# Patient Record
Sex: MALE | Race: WHITE
[De-identification: names, ages, dates, MRNs, and addresses within clinical notes are randomized per-mention and may not be internally consistent; named-entity substitution may affect disease eponyms.]

---

## 2019-03-02 ENCOUNTER — HOSPITAL ENCOUNTER (INPATIENT)
Dept: HOSPITAL 10 - E/R | Age: 49
LOS: 3 days | Discharge: HOME | DRG: 65 | End: 2019-03-05
Attending: INTERNAL MEDICINE | Admitting: INTERNAL MEDICINE
Payer: COMMERCIAL

## 2019-03-02 ENCOUNTER — HOSPITAL ENCOUNTER (INPATIENT)
Dept: HOSPITAL 91 - E/R | Age: 49
LOS: 3 days | Discharge: HOME | DRG: 65 | End: 2019-03-05
Payer: COMMERCIAL

## 2019-03-02 VITALS
BODY MASS INDEX: 27.1 KG/M2 | HEIGHT: 66 IN | WEIGHT: 168.65 LBS | BODY MASS INDEX: 27.1 KG/M2 | HEIGHT: 66 IN | WEIGHT: 168.65 LBS

## 2019-03-02 DIAGNOSIS — I10: ICD-10-CM

## 2019-03-02 DIAGNOSIS — E11.65: ICD-10-CM

## 2019-03-02 DIAGNOSIS — I16.1: ICD-10-CM

## 2019-03-02 DIAGNOSIS — I61.9: Primary | ICD-10-CM

## 2019-03-02 DIAGNOSIS — G51.31: ICD-10-CM

## 2019-03-02 LAB
ADD MAN DIFF?: NO
ANION GAP: 13 (ref 5–13)
BASOPHIL #: 0 10^3/UL (ref 0–0.1)
BASOPHILS %: 0.4 % (ref 0–2)
BLOOD UREA NITROGEN: 10 MG/DL (ref 7–20)
CALCIUM: 9.5 MG/DL (ref 8.4–10.2)
CARBON DIOXIDE: 28 MMOL/L (ref 21–31)
CHLORIDE: 95 MMOL/L (ref 97–110)
CHOL/HDL RATIO: 5.1 RATIO
CHOLESTEROL: 216 MG/DL (ref 100–200)
CREATININE: 0.89 MG/DL (ref 0.61–1.24)
EOSINOPHILS #: 0 10^3/UL (ref 0–0.5)
EOSINOPHILS %: 0.1 % (ref 0–7)
GLUCOSE: 353 MG/DL (ref 70–220)
HDL CHOLESTEROL: 42 MG/DL (ref 28–71)
HEMATOCRIT: 45.3 % (ref 42–52)
HEMOGLOBIN A1C: 10 % (ref 0–5.9)
HEMOGLOBIN: 16.4 G/DL (ref 14–18)
IMMATURE GRANS #M: 0.03 10^3/UL (ref 0–0.03)
IMMATURE GRANS % (M): 0.3 % (ref 0–0.43)
INR: 0.89
INR: 0.94
LDL CHOLESTEROL,CALCULATED: 129 MG/DL
LYMPHOCYTES #: 1.4 10^3/UL (ref 0.8–2.9)
LYMPHOCYTES %: 13 % (ref 15–51)
MEAN CORPUSCULAR HEMOGLOBIN: 31.2 PG (ref 29–33)
MEAN CORPUSCULAR HGB CONC: 36.2 G/DL (ref 32–37)
MEAN CORPUSCULAR VOLUME: 86.3 FL (ref 82–101)
MEAN PLATELET VOLUME: 10.3 FL (ref 7.4–10.4)
MONOCYTE #: 0.5 10^3/UL (ref 0.3–0.9)
MONOCYTES %: 4.7 % (ref 0–11)
NEUTROPHIL #: 8.7 10^3/UL (ref 1.6–7.5)
NEUTROPHILS %: 81.5 % (ref 39–77)
NUCLEATED RED BLOOD CELLS #: 0 10^3/UL (ref 0–0)
NUCLEATED RED BLOOD CELLS%: 0 /100WBC (ref 0–0)
PARTIAL THROMBOPLASTIN TIME: 24.1 SEC (ref 23–35)
PARTIAL THROMBOPLASTIN TIME: 24.6 SEC (ref 23–35)
PLATELET COUNT: 266 10^3/UL (ref 140–415)
POTASSIUM: 4.1 MMOL/L (ref 3.5–5.1)
PROTIME: 12.2 SEC (ref 11.9–14.9)
PROTIME: 12.7 SEC (ref 11.9–14.9)
PT RATIO: 1
PT RATIO: 1
RED BLOOD COUNT: 5.25 10^6/UL (ref 4.7–6.1)
RED CELL DISTRIBUTION WIDTH: 10.8 % (ref 11.5–14.5)
SODIUM: 136 MMOL/L (ref 135–144)
TRIGLYCERIDES: 226 MG/DL (ref 0–149)
TROPONIN-I: < 0.012 NG/ML (ref 0–0.12)
WHITE BLOOD COUNT: 10.7 10^3/UL (ref 4.8–10.8)

## 2019-03-02 PROCEDURE — 97162 PT EVAL MOD COMPLEX 30 MIN: CPT

## 2019-03-02 PROCEDURE — 83036 HEMOGLOBIN GLYCOSYLATED A1C: CPT

## 2019-03-02 PROCEDURE — 85610 PROTHROMBIN TIME: CPT

## 2019-03-02 PROCEDURE — 96375 TX/PRO/DX INJ NEW DRUG ADDON: CPT

## 2019-03-02 PROCEDURE — 83735 ASSAY OF MAGNESIUM: CPT

## 2019-03-02 PROCEDURE — 97167 OT EVAL HIGH COMPLEX 60 MIN: CPT

## 2019-03-02 PROCEDURE — 87081 CULTURE SCREEN ONLY: CPT

## 2019-03-02 PROCEDURE — 71045 X-RAY EXAM CHEST 1 VIEW: CPT

## 2019-03-02 PROCEDURE — 82962 GLUCOSE BLOOD TEST: CPT

## 2019-03-02 PROCEDURE — 80048 BASIC METABOLIC PNL TOTAL CA: CPT

## 2019-03-02 PROCEDURE — 70496 CT ANGIOGRAPHY HEAD: CPT

## 2019-03-02 PROCEDURE — 70450 CT HEAD/BRAIN W/O DYE: CPT

## 2019-03-02 PROCEDURE — 85025 COMPLETE CBC W/AUTO DIFF WBC: CPT

## 2019-03-02 PROCEDURE — 96374 THER/PROPH/DIAG INJ IV PUSH: CPT

## 2019-03-02 PROCEDURE — 85730 THROMBOPLASTIN TIME PARTIAL: CPT

## 2019-03-02 PROCEDURE — 93005 ELECTROCARDIOGRAM TRACING: CPT

## 2019-03-02 PROCEDURE — 36415 COLL VENOUS BLD VENIPUNCTURE: CPT

## 2019-03-02 PROCEDURE — 80307 DRUG TEST PRSMV CHEM ANLYZR: CPT

## 2019-03-02 PROCEDURE — 92610 EVALUATE SWALLOWING FUNCTION: CPT

## 2019-03-02 PROCEDURE — 80053 COMPREHEN METABOLIC PANEL: CPT

## 2019-03-02 PROCEDURE — 70498 CT ANGIOGRAPHY NECK: CPT

## 2019-03-02 PROCEDURE — 97530 THERAPEUTIC ACTIVITIES: CPT

## 2019-03-02 PROCEDURE — 81003 URINALYSIS AUTO W/O SCOPE: CPT

## 2019-03-02 PROCEDURE — 80061 LIPID PANEL: CPT

## 2019-03-02 PROCEDURE — 70551 MRI BRAIN STEM W/O DYE: CPT

## 2019-03-02 PROCEDURE — 84484 ASSAY OF TROPONIN QUANT: CPT

## 2019-03-02 PROCEDURE — 99291 CRITICAL CARE FIRST HOUR: CPT

## 2019-03-02 PROCEDURE — 84100 ASSAY OF PHOSPHORUS: CPT

## 2019-03-02 RX ADMIN — VASOPRESSIN 1 ML/2 S: 20 INJECTION, SOLUTION INTRAMUSCULAR; SUBCUTANEOUS at 23:54

## 2019-03-02 RX ADMIN — LABETALOL HYDROCHLORIDE 1 MG: 5 INJECTION, SOLUTION INTRAVENOUS at 23:30

## 2019-03-02 RX ADMIN — NICARDIPINE HYDROCHLORIDE 1 MLS/HR: 0.1 INJECTION, SOLUTION INTRAVENOUS at 23:29

## 2019-03-02 RX ADMIN — SODIUM CHLORIDE 1 ML: 9 INJECTION, SOLUTION INTRAMUSCULAR; INTRAVENOUS; SUBCUTANEOUS at 23:54

## 2019-03-02 RX ADMIN — LIDOCAINE HYDROCHLORIDE 1 MLS/HR: 10 INJECTION, SOLUTION EPIDURAL; INFILTRATION; INTRACAUDAL; PERINEURAL at 22:48

## 2019-03-02 RX ADMIN — LABETALOL HYDROCHLORIDE 1 MG: 5 INJECTION, SOLUTION INTRAVENOUS at 22:47

## 2019-03-02 RX ADMIN — IODIXANOL 1 ML: 320 INJECTION, SOLUTION INTRAVASCULAR at 23:54

## 2019-03-02 NOTE — ERD
ER Documentation


Chief Complaint


Chief Complaint





pt c/o dizziness and weakness x 2 hours





HPI


48-year-old male with no known past medical history presenting with dizziness 


and weakness that started around 8 PM tonight.  He was driving when he suddenly 


felt some dizziness, like he was "drunk".  He was having some difficulty walking


as well due to the dizziness.  He had no other associated symptoms.  He denies 


any headache, vision disturbance, focal weakness or numbness, chest pain, or 


shortness of breath.  He does generally feel weak however.  His family member at


home checked his blood sugar and it was high in the 300s.  He came to the ER for


evaluation and was noted to be hypertensive and brought back in.





ROS


All systems reviewed and are negative except as per history of present illness.





Allergies


Allergies:  


Coded Allergies:  


     No Known Allergy (Unverified , 3/2/19)





PMhx/Soc


Medical and Surgical Hx:  pt denies Surgical Hx


History of Surgery:  No


Hx Alcohol Use:  Yes (Daily, few beers)


Hx Substance Use:  No


Hx Tobacco Use:  No





FmHx


Hypertension


Family History:  diabetes





Physical Exam


Vitals





Vital Signs


  Date      Temp  Pulse  Resp  B/P (MAP)   Pulse Ox  O2          O2 Flow    FiO2


Time                                                 Delivery    Rate


    3/2/19  98.7     98    16     242/140        97


     22:22                          (174)





Physical Exam


Const:   No acute distress


Head:   Atraumatic 


Eyes:    Normal Conjunctiva, PERRLA, EOMI, no nystagmus


ENT:    Normal External Ears, Nose and Mouth.


Neck:               Full range of motion. No meningismus.  No JVD


Resp:   Clear to auscultation bilaterally


Cardio:   Regular rate and rhythm, no murmurs.  2+ distal pulses in all 4 


extremities, equal bilaterally


Abd:    Soft, non tender, non distended. Normal bowel sounds


Skin:   No petechiae or rashes


Back:   No midline or flank tenderness


Ext:    No cyanosis, or edema


Neur:   Awake and alert, oriented x3, no facial asymmetry, cranial nerves 


intact, strength and sensations intact in all 4 extremities, somewhat unsteady 


gait


Psych:    Normal Mood and Affect


Result Diagram:  


3/2/19 2244                                                                     


          3/2/19 2244





Results 24 hrs





Laboratory Tests


Test
                                  3/2/19
22:44  3/2/19
23:14  3/2/19
23:25


White Blood Count                     10.7 10^3/ul


Red Blood Count                       5.25 10^6/ul


Hemoglobin                               16.4 g/dl


Hematocrit                                  45.3 %


Mean Corpuscular Volume                    86.3 fl


Mean Corpuscular Hemoglobin                31.2 pg


Mean Corpuscular Hemoglobin
Concent     36.2 g/dl 
  
             



Red Cell Distribution Width                 10.8 %


Platelet Count                         266 10^3/UL


Mean Platelet Volume                       10.3 fl


Immature Granulocytes %                    0.300 %


Neutrophils %                               81.5 %


Lymphocytes %                               13.0 %


Monocytes %                                  4.7 %


Eosinophils %                                0.1 %


Basophils %                                  0.4 %


Nucleated Red Blood Cells %            0.0 /100WBC


Immature Granulocytes #              0.030 10^3/ul


Neutrophils #                          8.7 10^3/ul


Lymphocytes #                          1.4 10^3/ul


Monocytes #                            0.5 10^3/ul


Eosinophils #                          0.0 10^3/ul


Basophils #                            0.0 10^3/ul


Nucleated Red Blood Cells #            0.0 10^3/ul


Prothrombin Time                          12.2 Sec                    12.7 Sec


Prothrombin Time Ratio                         1.0                         1.0


INR International Normalized
Ratio           0.89 
  
                   0.94 



Activated Partial
Thromboplast Time      24.6 Sec 
  
               24.1 Sec 



Sodium Level                            136 mmol/L


Potassium Level                         4.1 mmol/L


Chloride Level                           95 mmol/L


Carbon Dioxide Level                     28 mmol/L


Anion Gap                                       13


Blood Urea Nitrogen                       10 mg/dl


Creatinine                              0.89 mg/dl


Est Glomerular Filtrat Rate
mL/min   > 60 mL/min 
   
             



Glucose Level                            353 mg/dl


Hemoglobin A1c                              10.0 %


Calcium Level                            9.5 mg/dl


Troponin I                           < 0.012 ng/ml


Bedside Glucose                                        347 mg/dL


Triglycerides Level                                                  226 mg/dl


Cholesterol Level                                                    216 mg/dl


LDL Cholesterol, Calculated                                          129 mg/dl


HDL Cholesterol                                                       42 mg/dl


Cholesterol/HDL Ratio                                                5.1 RATIO


Ethyl Alcohol Level                                                Pending





Current Medications


 Medications
   Dose
          Sig/Criselda
       Start Time
   Status  Last


 (Trade)       Ordered        Route
 PRN     Stop Time              Admin
Dose


                              Reason                                Admin


 Sodium         500 ml @ 
     Q1H STAT
      3/2/19        DC            3/2/19


Chloride       500 mls/hr     IV
            22:34
 3/2/19                22:48



                                             23:33


 Labetalol      20 mg          ONCE  ONCE
    3/2/19        DC            3/2/19


HCl
                          IV
            23:00
 3/2/19                22:47



(Labetalol)                                  23:01


 Nicardipine    200 ml @ 
     ONCE  STAT
    3/2/19                      3/2/19


HCl            50 mls/hr      IV
            23:17
 3/3/19                23:29



                                             03:16


 Labetalol      20 mg          ONCE  ONCE
    3/2/19        DC       



HCl
                          IV
            23:30
 3/2/19


(Labetalol)                                  23:31


 IV Flush
      10 ml          STK-MED        3/2/19        DC            3/2/19


(NS 10 ml)                    ONCE
 .ROUTE
  23:27
 3/2/19                23:54



                                             23:28


 Sodium         100 ml @ ud    STK-MED        3/2/19        DC            3/2/19


Chloride                      ONCE
 .ROUTE
  23:27
 3/2/19                23:54



                                             23:28


 Iodixanol
     100 ml         STK-MED        3/2/19        DC            3/2/19


(Visipaque                    ONCE
 .ROUTE
  23:27
 3/2/19                23:54



Locm)                                        23:28


 IV Flush
      10 ml          STK-MED        3/2/19        DC       



(NS 10 ml)                    ONCE
 .ROUTE
  23:36
 3/2/19


                                             23:37


 Sodium         100 ml @ ud    STK-MED        3/2/19        DC       



Chloride                      ONCE
 .ROUTE
  23:36
 3/2/19


                                             23:37


 Iohexol        100 ml @ ud    STK-MED        3/2/19        DC       



                              ONCE
 .ROUTE
  23:36
 3/2/19


                                             23:37








Procedures/MDM


EMERGENT LABS AND DIAGNOSTIC STUDIES: 


Lab Results above were reviewed and interpreted by me. 





CBC: no anemia or evidence of infection


BMP: Hyperglycemia without evidence of acidosis.  No evidence of electrolyte 


abnormality, renal failure, hypoglycemia


Troponin within normal limits, not indicative of cardiac ischemia


UA: Pending





12-lead EKG was interpreted by ALEX Martinez MD: 


Normal Sinus Rhythm  


Normal axis 


Normal intervals 


Anterior T waves with high amplitude


No arrhythmia or acute STEMI. 


___________________________________________________________ 


Radiology Results as interpreted by Radiology below were reviewed by JULIO Martinez MD: 





Chest x-ray:


No acute abnormalities





CT head:


FINDINGS:


There is an area of parenchymal hemorrhage within the right thalamus measuring 


1.4 cm AP by 1.9 cm transverse by 1.9 cm sagittal (volume of 2.5 cm3). The 


ventricles and cortical sulci are within normal limits for patient's age. There 


is no midline shift. There is no abnormal extra-axial collection. 


 


The calvarium is intact. There is no evidence of fracture. Visualized paranasal 


sinuses and mastoid air cells are clear.  


 


IMPRESSION:


Area of parenchymal hemorrhage within the right thalamus (volume of 2.5 cm3). 


There is no midline shift.


 


Critical results were discussed with Dr. Martinez at 11:33 p.m.


_____________________________________________ 


.Julien Macedo MD, MD           Date    Time 


Electronically viewed and signed by .Julien Macedo MD, MD on 03/02/2019 23:35 





___________________________________________________________ 


Initial Nursing notes reviewed. 


Previous Medical Records requested via the Electronic Health Record. 





EMERGENCY DEPARTMENT COURSE / MEDICAL DECISION MAKING: 





Patient is presenting with dizziness and problems with balance that started at 8


PM today.  He was noted to have severe hypertension.  Labs were notable for 


hyperglycemia.  CT head was done to evaluate for possibility of stroke.  At 


11:30 PM, I was called and notified about a right thalamic small hemorrhage.  No


evidence of ventricular involvement or increased intracranial pressure.  Patient


was treated with labetalol 20 mg IV initially, then another 20 mg was ordered.  


He was started on a Cardene drip to control his blood pressure.  I spoke with 


Dr. wayne at about 11:45 PM and discussed the case.  He only recommended blood


pressure control but stated that rarely these patients need any type of 


intervention neurosurgically.  I also spoke with the tele-neurologist on-call, 


Dr. Gutierrez at 11:47 PM, and he also recommended the same, blood pressure control.


 The patient is clearly not a TPA candidate as he has an acute intracranial he


morrhage.  He is mentating normally upon reevaluation.  Patient will require 


admission to the ICU for hypertensive emergency and frequent neuro exams.  He 


was also noted to have hyperglycemia, likely new onset diabetes.  This will also


need to be treated and worked up as well.





Neuro Critical Care:


Critical Care 


   Time:            40 minutes


   Treatments/Evaluations:   Continuous neurologic and cardiovascular monitoring


for deterioration of neurologic function and complications, while obtaining 


immediate neurologic imaging.  Considerations made for TPA and invasive therapy 


with discussions with family.





Accepting Care Team:


Current data and ongoing care discussed.


Time:                      Time of admission


Primary Provider:      Dr. Cruz


Consulting:                  Dr. Wayne (Neurosurgery)


Outstanding Data:       none





Departure


Diagnosis:  


   Primary Impression:  


   Hypertensive emergency


   Additional Impressions:  


   Intracranial hemorrhage, spontaneous intraparenchymal, associated with 


   hypertension, acute


   Hyperglycemia


Condition:  Critical











JIM MARTINEZ MD          Mar 2, 2019 22:43

## 2019-03-03 VITALS — HEART RATE: 77 BPM | RESPIRATION RATE: 16 BRPM | DIASTOLIC BLOOD PRESSURE: 85 MMHG | SYSTOLIC BLOOD PRESSURE: 131 MMHG

## 2019-03-03 VITALS — SYSTOLIC BLOOD PRESSURE: 118 MMHG | HEART RATE: 71 BPM | RESPIRATION RATE: 12 BRPM | DIASTOLIC BLOOD PRESSURE: 78 MMHG

## 2019-03-03 VITALS — RESPIRATION RATE: 25 BRPM | DIASTOLIC BLOOD PRESSURE: 84 MMHG | SYSTOLIC BLOOD PRESSURE: 140 MMHG | HEART RATE: 93 BPM

## 2019-03-03 VITALS — DIASTOLIC BLOOD PRESSURE: 80 MMHG | HEART RATE: 72 BPM | RESPIRATION RATE: 14 BRPM | SYSTOLIC BLOOD PRESSURE: 124 MMHG

## 2019-03-03 VITALS — DIASTOLIC BLOOD PRESSURE: 88 MMHG | SYSTOLIC BLOOD PRESSURE: 116 MMHG | HEART RATE: 79 BPM | RESPIRATION RATE: 15 BRPM

## 2019-03-03 VITALS — DIASTOLIC BLOOD PRESSURE: 93 MMHG | RESPIRATION RATE: 21 BRPM | HEART RATE: 91 BPM | SYSTOLIC BLOOD PRESSURE: 146 MMHG

## 2019-03-03 VITALS — DIASTOLIC BLOOD PRESSURE: 90 MMHG | HEART RATE: 92 BPM | SYSTOLIC BLOOD PRESSURE: 144 MMHG | RESPIRATION RATE: 16 BRPM

## 2019-03-03 VITALS — HEART RATE: 74 BPM | SYSTOLIC BLOOD PRESSURE: 110 MMHG | RESPIRATION RATE: 16 BRPM | DIASTOLIC BLOOD PRESSURE: 76 MMHG

## 2019-03-03 VITALS — RESPIRATION RATE: 21 BRPM | HEART RATE: 85 BPM | SYSTOLIC BLOOD PRESSURE: 116 MMHG | DIASTOLIC BLOOD PRESSURE: 77 MMHG

## 2019-03-03 VITALS — RESPIRATION RATE: 16 BRPM | HEART RATE: 82 BPM | SYSTOLIC BLOOD PRESSURE: 147 MMHG | DIASTOLIC BLOOD PRESSURE: 79 MMHG

## 2019-03-03 VITALS — SYSTOLIC BLOOD PRESSURE: 125 MMHG | DIASTOLIC BLOOD PRESSURE: 88 MMHG | HEART RATE: 79 BPM | RESPIRATION RATE: 14 BRPM

## 2019-03-03 VITALS — HEART RATE: 75 BPM | SYSTOLIC BLOOD PRESSURE: 127 MMHG | DIASTOLIC BLOOD PRESSURE: 80 MMHG | RESPIRATION RATE: 18 BRPM

## 2019-03-03 VITALS — RESPIRATION RATE: 14 BRPM | SYSTOLIC BLOOD PRESSURE: 123 MMHG | HEART RATE: 74 BPM | DIASTOLIC BLOOD PRESSURE: 75 MMHG

## 2019-03-03 VITALS — SYSTOLIC BLOOD PRESSURE: 125 MMHG | RESPIRATION RATE: 15 BRPM | HEART RATE: 72 BPM | DIASTOLIC BLOOD PRESSURE: 81 MMHG

## 2019-03-03 VITALS — RESPIRATION RATE: 13 BRPM | SYSTOLIC BLOOD PRESSURE: 117 MMHG | HEART RATE: 91 BPM | DIASTOLIC BLOOD PRESSURE: 69 MMHG

## 2019-03-03 VITALS — DIASTOLIC BLOOD PRESSURE: 89 MMHG | HEART RATE: 93 BPM | SYSTOLIC BLOOD PRESSURE: 140 MMHG | RESPIRATION RATE: 20 BRPM

## 2019-03-03 VITALS — SYSTOLIC BLOOD PRESSURE: 125 MMHG | RESPIRATION RATE: 17 BRPM | DIASTOLIC BLOOD PRESSURE: 80 MMHG | HEART RATE: 72 BPM

## 2019-03-03 VITALS — DIASTOLIC BLOOD PRESSURE: 93 MMHG | RESPIRATION RATE: 15 BRPM | SYSTOLIC BLOOD PRESSURE: 153 MMHG | HEART RATE: 87 BPM

## 2019-03-03 VITALS — DIASTOLIC BLOOD PRESSURE: 85 MMHG | HEART RATE: 75 BPM | SYSTOLIC BLOOD PRESSURE: 126 MMHG | RESPIRATION RATE: 13 BRPM

## 2019-03-03 VITALS — RESPIRATION RATE: 15 BRPM | SYSTOLIC BLOOD PRESSURE: 108 MMHG | DIASTOLIC BLOOD PRESSURE: 79 MMHG | HEART RATE: 74 BPM

## 2019-03-03 VITALS — RESPIRATION RATE: 18 BRPM | SYSTOLIC BLOOD PRESSURE: 133 MMHG | HEART RATE: 81 BPM | DIASTOLIC BLOOD PRESSURE: 89 MMHG

## 2019-03-03 VITALS — HEART RATE: 80 BPM | RESPIRATION RATE: 16 BRPM | DIASTOLIC BLOOD PRESSURE: 83 MMHG | SYSTOLIC BLOOD PRESSURE: 133 MMHG

## 2019-03-03 VITALS — RESPIRATION RATE: 18 BRPM | HEART RATE: 81 BPM | SYSTOLIC BLOOD PRESSURE: 117 MMHG | DIASTOLIC BLOOD PRESSURE: 76 MMHG

## 2019-03-03 VITALS — DIASTOLIC BLOOD PRESSURE: 88 MMHG | SYSTOLIC BLOOD PRESSURE: 137 MMHG | HEART RATE: 92 BPM | RESPIRATION RATE: 17 BRPM

## 2019-03-03 VITALS — RESPIRATION RATE: 23 BRPM | SYSTOLIC BLOOD PRESSURE: 134 MMHG | DIASTOLIC BLOOD PRESSURE: 86 MMHG | HEART RATE: 87 BPM

## 2019-03-03 VITALS — RESPIRATION RATE: 18 BRPM | DIASTOLIC BLOOD PRESSURE: 86 MMHG | HEART RATE: 76 BPM | SYSTOLIC BLOOD PRESSURE: 119 MMHG

## 2019-03-03 VITALS — DIASTOLIC BLOOD PRESSURE: 89 MMHG | HEART RATE: 75 BPM | RESPIRATION RATE: 15 BRPM | SYSTOLIC BLOOD PRESSURE: 137 MMHG

## 2019-03-03 VITALS — RESPIRATION RATE: 19 BRPM | SYSTOLIC BLOOD PRESSURE: 124 MMHG | DIASTOLIC BLOOD PRESSURE: 93 MMHG | HEART RATE: 94 BPM

## 2019-03-03 VITALS — RESPIRATION RATE: 18 BRPM | HEART RATE: 72 BPM | DIASTOLIC BLOOD PRESSURE: 84 MMHG | SYSTOLIC BLOOD PRESSURE: 125 MMHG

## 2019-03-03 VITALS — DIASTOLIC BLOOD PRESSURE: 96 MMHG | SYSTOLIC BLOOD PRESSURE: 164 MMHG | RESPIRATION RATE: 20 BRPM | HEART RATE: 92 BPM

## 2019-03-03 VITALS — HEART RATE: 87 BPM | SYSTOLIC BLOOD PRESSURE: 150 MMHG | DIASTOLIC BLOOD PRESSURE: 99 MMHG | RESPIRATION RATE: 16 BRPM

## 2019-03-03 VITALS — HEART RATE: 79 BPM | SYSTOLIC BLOOD PRESSURE: 131 MMHG | RESPIRATION RATE: 15 BRPM | DIASTOLIC BLOOD PRESSURE: 74 MMHG

## 2019-03-03 VITALS — DIASTOLIC BLOOD PRESSURE: 87 MMHG | HEART RATE: 75 BPM | RESPIRATION RATE: 12 BRPM | SYSTOLIC BLOOD PRESSURE: 131 MMHG

## 2019-03-03 VITALS — HEART RATE: 88 BPM | SYSTOLIC BLOOD PRESSURE: 135 MMHG | RESPIRATION RATE: 20 BRPM | DIASTOLIC BLOOD PRESSURE: 96 MMHG

## 2019-03-03 VITALS — SYSTOLIC BLOOD PRESSURE: 127 MMHG | HEART RATE: 82 BPM | DIASTOLIC BLOOD PRESSURE: 77 MMHG | RESPIRATION RATE: 12 BRPM

## 2019-03-03 VITALS — DIASTOLIC BLOOD PRESSURE: 73 MMHG | SYSTOLIC BLOOD PRESSURE: 119 MMHG | HEART RATE: 85 BPM | RESPIRATION RATE: 17 BRPM

## 2019-03-03 VITALS — HEART RATE: 73 BPM | SYSTOLIC BLOOD PRESSURE: 117 MMHG | RESPIRATION RATE: 15 BRPM | DIASTOLIC BLOOD PRESSURE: 83 MMHG

## 2019-03-03 VITALS — SYSTOLIC BLOOD PRESSURE: 141 MMHG | DIASTOLIC BLOOD PRESSURE: 108 MMHG | HEART RATE: 89 BPM | RESPIRATION RATE: 16 BRPM

## 2019-03-03 VITALS — RESPIRATION RATE: 16 BRPM | HEART RATE: 92 BPM | DIASTOLIC BLOOD PRESSURE: 82 MMHG | SYSTOLIC BLOOD PRESSURE: 137 MMHG

## 2019-03-03 VITALS — RESPIRATION RATE: 19 BRPM | HEART RATE: 83 BPM | SYSTOLIC BLOOD PRESSURE: 127 MMHG | DIASTOLIC BLOOD PRESSURE: 83 MMHG

## 2019-03-03 VITALS — DIASTOLIC BLOOD PRESSURE: 89 MMHG | SYSTOLIC BLOOD PRESSURE: 155 MMHG | RESPIRATION RATE: 13 BRPM | HEART RATE: 90 BPM

## 2019-03-03 VITALS — RESPIRATION RATE: 18 BRPM | SYSTOLIC BLOOD PRESSURE: 142 MMHG | HEART RATE: 82 BPM | DIASTOLIC BLOOD PRESSURE: 101 MMHG

## 2019-03-03 VITALS — DIASTOLIC BLOOD PRESSURE: 91 MMHG | RESPIRATION RATE: 15 BRPM | SYSTOLIC BLOOD PRESSURE: 144 MMHG | HEART RATE: 90 BPM

## 2019-03-03 VITALS — RESPIRATION RATE: 14 BRPM | HEART RATE: 82 BPM | DIASTOLIC BLOOD PRESSURE: 86 MMHG | SYSTOLIC BLOOD PRESSURE: 129 MMHG

## 2019-03-03 VITALS — RESPIRATION RATE: 14 BRPM | HEART RATE: 85 BPM | SYSTOLIC BLOOD PRESSURE: 134 MMHG | DIASTOLIC BLOOD PRESSURE: 88 MMHG

## 2019-03-03 VITALS — HEART RATE: 84 BPM | SYSTOLIC BLOOD PRESSURE: 116 MMHG | RESPIRATION RATE: 25 BRPM | DIASTOLIC BLOOD PRESSURE: 93 MMHG

## 2019-03-03 VITALS — HEART RATE: 90 BPM

## 2019-03-03 VITALS — SYSTOLIC BLOOD PRESSURE: 126 MMHG | DIASTOLIC BLOOD PRESSURE: 84 MMHG | HEART RATE: 77 BPM | RESPIRATION RATE: 17 BRPM

## 2019-03-03 VITALS — DIASTOLIC BLOOD PRESSURE: 86 MMHG | RESPIRATION RATE: 14 BRPM | SYSTOLIC BLOOD PRESSURE: 127 MMHG | HEART RATE: 80 BPM

## 2019-03-03 VITALS — SYSTOLIC BLOOD PRESSURE: 150 MMHG | RESPIRATION RATE: 17 BRPM | DIASTOLIC BLOOD PRESSURE: 86 MMHG | HEART RATE: 89 BPM

## 2019-03-03 VITALS — RESPIRATION RATE: 13 BRPM | HEART RATE: 70 BPM | SYSTOLIC BLOOD PRESSURE: 119 MMHG | DIASTOLIC BLOOD PRESSURE: 73 MMHG

## 2019-03-03 VITALS — RESPIRATION RATE: 18 BRPM | HEART RATE: 86 BPM | DIASTOLIC BLOOD PRESSURE: 83 MMHG | SYSTOLIC BLOOD PRESSURE: 136 MMHG

## 2019-03-03 VITALS — SYSTOLIC BLOOD PRESSURE: 114 MMHG | RESPIRATION RATE: 17 BRPM | DIASTOLIC BLOOD PRESSURE: 75 MMHG | HEART RATE: 76 BPM

## 2019-03-03 VITALS — SYSTOLIC BLOOD PRESSURE: 121 MMHG | HEART RATE: 78 BPM | RESPIRATION RATE: 14 BRPM | DIASTOLIC BLOOD PRESSURE: 83 MMHG

## 2019-03-03 VITALS — RESPIRATION RATE: 14 BRPM | HEART RATE: 80 BPM | SYSTOLIC BLOOD PRESSURE: 127 MMHG | DIASTOLIC BLOOD PRESSURE: 84 MMHG

## 2019-03-03 VITALS — DIASTOLIC BLOOD PRESSURE: 79 MMHG | HEART RATE: 74 BPM | SYSTOLIC BLOOD PRESSURE: 129 MMHG | RESPIRATION RATE: 16 BRPM

## 2019-03-03 VITALS — SYSTOLIC BLOOD PRESSURE: 126 MMHG | RESPIRATION RATE: 21 BRPM | HEART RATE: 83 BPM | DIASTOLIC BLOOD PRESSURE: 83 MMHG

## 2019-03-03 VITALS — HEART RATE: 92 BPM | RESPIRATION RATE: 13 BRPM | DIASTOLIC BLOOD PRESSURE: 94 MMHG | SYSTOLIC BLOOD PRESSURE: 146 MMHG

## 2019-03-03 VITALS — HEART RATE: 85 BPM | DIASTOLIC BLOOD PRESSURE: 90 MMHG | RESPIRATION RATE: 14 BRPM | SYSTOLIC BLOOD PRESSURE: 132 MMHG

## 2019-03-03 VITALS — HEART RATE: 80 BPM

## 2019-03-03 VITALS — HEART RATE: 68 BPM | DIASTOLIC BLOOD PRESSURE: 68 MMHG | RESPIRATION RATE: 14 BRPM | SYSTOLIC BLOOD PRESSURE: 106 MMHG

## 2019-03-03 VITALS — HEART RATE: 88 BPM | SYSTOLIC BLOOD PRESSURE: 141 MMHG | DIASTOLIC BLOOD PRESSURE: 91 MMHG | RESPIRATION RATE: 19 BRPM

## 2019-03-03 VITALS — HEART RATE: 90 BPM | SYSTOLIC BLOOD PRESSURE: 141 MMHG | RESPIRATION RATE: 17 BRPM | DIASTOLIC BLOOD PRESSURE: 88 MMHG

## 2019-03-03 VITALS — HEART RATE: 67 BPM | SYSTOLIC BLOOD PRESSURE: 118 MMHG | DIASTOLIC BLOOD PRESSURE: 79 MMHG | RESPIRATION RATE: 15 BRPM

## 2019-03-03 VITALS — DIASTOLIC BLOOD PRESSURE: 77 MMHG | RESPIRATION RATE: 19 BRPM | SYSTOLIC BLOOD PRESSURE: 121 MMHG | HEART RATE: 82 BPM

## 2019-03-03 VITALS — HEART RATE: 80 BPM | RESPIRATION RATE: 17 BRPM | DIASTOLIC BLOOD PRESSURE: 88 MMHG | SYSTOLIC BLOOD PRESSURE: 115 MMHG

## 2019-03-03 VITALS — SYSTOLIC BLOOD PRESSURE: 142 MMHG | DIASTOLIC BLOOD PRESSURE: 87 MMHG | HEART RATE: 90 BPM | RESPIRATION RATE: 19 BRPM

## 2019-03-03 VITALS — DIASTOLIC BLOOD PRESSURE: 89 MMHG | HEART RATE: 84 BPM | SYSTOLIC BLOOD PRESSURE: 134 MMHG | RESPIRATION RATE: 15 BRPM

## 2019-03-03 VITALS — RESPIRATION RATE: 18 BRPM | HEART RATE: 91 BPM

## 2019-03-03 VITALS — HEART RATE: 84 BPM | SYSTOLIC BLOOD PRESSURE: 132 MMHG | RESPIRATION RATE: 16 BRPM | DIASTOLIC BLOOD PRESSURE: 82 MMHG

## 2019-03-03 VITALS — DIASTOLIC BLOOD PRESSURE: 107 MMHG | RESPIRATION RATE: 21 BRPM | SYSTOLIC BLOOD PRESSURE: 153 MMHG | HEART RATE: 105 BPM

## 2019-03-03 VITALS — SYSTOLIC BLOOD PRESSURE: 136 MMHG | DIASTOLIC BLOOD PRESSURE: 80 MMHG | RESPIRATION RATE: 17 BRPM | HEART RATE: 87 BPM

## 2019-03-03 VITALS — DIASTOLIC BLOOD PRESSURE: 86 MMHG | HEART RATE: 83 BPM | RESPIRATION RATE: 12 BRPM | SYSTOLIC BLOOD PRESSURE: 126 MMHG

## 2019-03-03 LAB
ADD MAN DIFF?: NO
ADD UMIC: NO
ALANINE AMINOTRANSFERASE: 28 IU/L (ref 13–69)
ALBUMIN/GLOBULIN RATIO: 1.27
ALBUMIN: 4.6 G/DL (ref 3.3–4.9)
ALKALINE PHOSPHATASE: 83 IU/L (ref 42–121)
ANION GAP: 14 (ref 5–13)
ASPARTATE AMINO TRANSFERASE: 36 IU/L (ref 15–46)
BASOPHIL #: 0 10^3/UL (ref 0–0.1)
BASOPHILS %: 0.4 % (ref 0–2)
BILIRUBIN,DIRECT: 0 MG/DL (ref 0–0.2)
BILIRUBIN,TOTAL: 0.3 MG/DL (ref 0.2–1.3)
BLOOD UREA NITROGEN: 11 MG/DL (ref 7–20)
CALCIUM: 9.2 MG/DL (ref 8.4–10.2)
CARBON DIOXIDE: 25 MMOL/L (ref 21–31)
CHLORIDE: 101 MMOL/L (ref 97–110)
CREATININE: 0.7 MG/DL (ref 0.61–1.24)
EOSINOPHILS #: 0.1 10^3/UL (ref 0–0.5)
EOSINOPHILS %: 0.5 % (ref 0–7)
ETHANOL: < 10 MG/DL (ref 0–0)
GLOBULIN: 3.6 G/DL (ref 1.3–3.2)
GLUCOSE: 247 MG/DL (ref 70–220)
HEMATOCRIT: 46.8 % (ref 42–52)
HEMOGLOBIN: 16.7 G/DL (ref 14–18)
IMMATURE GRANS #M: 0.03 10^3/UL (ref 0–0.03)
IMMATURE GRANS % (M): 0.3 % (ref 0–0.43)
LYMPHOCYTES #: 2.4 10^3/UL (ref 0.8–2.9)
LYMPHOCYTES %: 25.2 % (ref 15–51)
MEAN CORPUSCULAR HEMOGLOBIN: 30.9 PG (ref 29–33)
MEAN CORPUSCULAR HGB CONC: 35.7 G/DL (ref 32–37)
MEAN CORPUSCULAR VOLUME: 86.5 FL (ref 82–101)
MEAN PLATELET VOLUME: 10.3 FL (ref 7.4–10.4)
MONOCYTE #: 0.6 10^3/UL (ref 0.3–0.9)
MONOCYTES %: 6 % (ref 0–11)
NEUTROPHIL #: 6.6 10^3/UL (ref 1.6–7.5)
NEUTROPHILS %: 67.6 % (ref 39–77)
NUCLEATED RED BLOOD CELLS #: 0 10^3/UL (ref 0–0)
NUCLEATED RED BLOOD CELLS%: 0 /100WBC (ref 0–0)
PLATELET COUNT: 262 10^3/UL (ref 140–415)
POTASSIUM: 3.8 MMOL/L (ref 3.5–5.1)
RED BLOOD COUNT: 5.41 10^6/UL (ref 4.7–6.1)
RED CELL DISTRIBUTION WIDTH: 10.9 % (ref 11.5–14.5)
SODIUM: 140 MMOL/L (ref 135–144)
TOTAL PROTEIN: 8.2 G/DL (ref 6.1–8.1)
UR ASCORBIC ACID: NEGATIVE MG/DL
UR BILIRUBIN (DIP): NEGATIVE MG/DL
UR BLOOD (DIP): NEGATIVE MG/DL
UR CLARITY: CLEAR
UR COLOR: COLORLESS
UR GLUCOSE (DIP): (no result) MG/DL
UR KETONES (DIP): NEGATIVE MG/DL
UR LEUKOCYTE ESTERASE (DIP): NEGATIVE LEU/UL
UR NITRITE (DIP): NEGATIVE MG/DL
UR PH (DIP): 7 (ref 5–9)
UR SPECIFIC GRAVITY (DIP): 1.03 (ref 1–1.03)
UR TOTAL PROTEIN (DIP): NEGATIVE MG/DL
UR UROBILINOGEN (DIP): NEGATIVE MG/DL
WHITE BLOOD COUNT: 9.7 10^3/UL (ref 4.8–10.8)

## 2019-03-03 RX ADMIN — METOPROLOL TARTRATE SCH MG: 50 TABLET, FILM COATED ORAL at 22:21

## 2019-03-03 RX ADMIN — FAMOTIDINE 1 MG: 20 TABLET ORAL at 22:21

## 2019-03-03 RX ADMIN — METOPROLOL TARTRATE 1 MG: 50 TABLET, FILM COATED ORAL at 09:37

## 2019-03-03 RX ADMIN — FENOFIBRATE 1 MG: 48 TABLET ORAL at 11:27

## 2019-03-03 RX ADMIN — ABCIXIMAB 1 MLS/HR: 2 INJECTION, SOLUTION INTRAVENOUS at 15:27

## 2019-03-03 RX ADMIN — FAMOTIDINE SCH MG: 20 TABLET ORAL at 22:21

## 2019-03-03 RX ADMIN — ABCIXIMAB 1 MLS/HR: 2 INJECTION, SOLUTION INTRAVENOUS at 09:02

## 2019-03-03 RX ADMIN — AMLODIPINE BESYLATE SCH MG: 10 TABLET ORAL at 09:36

## 2019-03-03 RX ADMIN — PHENYLEPHRINE HYDROCHLORIDE SCH MLS/HR: 10 INJECTION INTRAVENOUS at 22:37

## 2019-03-03 RX ADMIN — PHENYLEPHRINE HYDROCHLORIDE SCH MLS/HR: 10 INJECTION INTRAVENOUS at 15:27

## 2019-03-03 RX ADMIN — INSULIN ASPART 1 UNIT: 100 INJECTION, SOLUTION INTRAVENOUS; SUBCUTANEOUS at 22:32

## 2019-03-03 RX ADMIN — AMLODIPINE BESYLATE 1 MG: 10 TABLET ORAL at 01:00

## 2019-03-03 RX ADMIN — METOPROLOL TARTRATE SCH MG: 50 TABLET, FILM COATED ORAL at 09:37

## 2019-03-03 RX ADMIN — ZOLPIDEM TARTRATE 1 MG: 5 TABLET, FILM COATED ORAL at 22:22

## 2019-03-03 RX ADMIN — ZOLPIDEM TARTRATE PRN MG: 5 TABLET, FILM COATED ORAL at 22:22

## 2019-03-03 RX ADMIN — PHENYLEPHRINE HYDROCHLORIDE SCH MLS/HR: 10 INJECTION INTRAVENOUS at 12:31

## 2019-03-03 RX ADMIN — AMLODIPINE BESYLATE 1 MG: 10 TABLET ORAL at 09:36

## 2019-03-03 RX ADMIN — FAMOTIDINE 1 MG: 20 TABLET ORAL at 09:36

## 2019-03-03 RX ADMIN — INSULIN ASPART 1 UNIT: 100 INJECTION, SOLUTION INTRAVENOUS; SUBCUTANEOUS at 17:25

## 2019-03-03 RX ADMIN — ABCIXIMAB 1 MLS/HR: 2 INJECTION, SOLUTION INTRAVENOUS at 05:06

## 2019-03-03 RX ADMIN — FENOFIBRATE SCH MG: 48 TABLET ORAL at 11:27

## 2019-03-03 RX ADMIN — FAMOTIDINE SCH MG: 20 TABLET ORAL at 09:36

## 2019-03-03 RX ADMIN — PHENYLEPHRINE HYDROCHLORIDE SCH MLS/HR: 10 INJECTION INTRAVENOUS at 09:02

## 2019-03-03 RX ADMIN — INSULIN ASPART 1 UNIT: 100 INJECTION, SOLUTION INTRAVENOUS; SUBCUTANEOUS at 13:19

## 2019-03-03 RX ADMIN — PHENYLEPHRINE HYDROCHLORIDE SCH MLS/HR: 10 INJECTION INTRAVENOUS at 05:06

## 2019-03-03 RX ADMIN — METOPROLOL TARTRATE 1 MG: 50 TABLET, FILM COATED ORAL at 22:21

## 2019-03-03 RX ADMIN — ABCIXIMAB 1 MLS/HR: 2 INJECTION, SOLUTION INTRAVENOUS at 22:37

## 2019-03-03 RX ADMIN — ABCIXIMAB 1 MLS/HR: 2 INJECTION, SOLUTION INTRAVENOUS at 12:31

## 2019-03-03 NOTE — CONS
DATE OF ADMISSION: 03/03/2019

DATE OF CONSULTATION:  03/03/2019

 

 

 

TYPE OF CONSULTATION:  Neurologic.

 

REQUESTING PHYSICIAN:  Dr. Jim Martinez.  

 

INDICATION FOR CONSULTATION:  Thalamic bleed, hemifacial spasm.

 

HISTORY OF PRESENT ILLNESS:  Patient is a 48-year-old right-handed male who reported a few hours of d
izziness and unsteadiness with gait that began around 8:00 while the patient was driving.  He states 
that he felt drunk.  He came to the ER.  He denied then or now any headache, nausea, vomiting, numbne
ss, tingling, or weakness.  Denies chest pain or shortness of breath.  The patient denies any medical
 problems, but the patient was noted to have a blood sugar of 300 and also noted to be hypertensive t
he ER.  A CT scan of the head without contrast performed, which showed a small 1.4 x 1.9 x 1.9 (2.5 M
l) a parenchymal hemorrhage in the right thalamus.  There is no shift or intraventricular hemorrhage.
  Patient also had a CT angiogram which showed no aneurysm or stenosis but again showed the bleed.  T
here is also no stenosis, aneurysm or dissection in the neck. 

 

PAST MEDICAL HISTORY:  The patient denies any medical problems, but is noted to have high blood sugar
 and likely has diabetes.  s head.  The patient does not follow up with his primary physician.  The p
atient does have history of hemifacial spasm for many years in the right side that reportedly began a
fter being hit during a soccer match.

 

ALLERGIES:  No known drug allergies.

 

MEDICATIONS:  The patient does not take any routine medications.

 

PAST SURGICAL HISTORY:  Denies.

 

FAMILY HISTORY:  No reported history of easy bruising or bleeding.

 

PHYSICAL EXAMINATION:

VITAL SIGNS:  The patient's temperature 98, pulse 76, respirations 18, blood pressure 119/86, saturat
ing 95% on room air.

GENERAL:  The patient is well-developed, well-nourished male lying in the hospital bed in no acute di
stress.

HEAD AND NECK:  Normocephalic, atraumatic.

NECK:  Supple, nontender.

CARDIAC:  Regular rate and rhythm.

VASCULAR:  No carotid bruit bilaterally and 2+ radial and dorsalis pedis pulses.

LUNGS:  Clear to auscultation.

ABDOMEN:  Nontender, nondistended, soft.

EXTREMITIES:  No clubbing, cyanosis, or edema.

NEUROLOGIC:  The patient is awake, alert, and oriented x3, fluent speech, follows commands readily ap
propriately.  He has normal attention, concentration and intact remote, immediate and recent memory. 
 Cranial nerves II through XII are serially tested and are intact, specifically II:  Visual fields gr
ossly full to confrontation.  Grossly normal visual acuity.  III:  Extraocular muscles intact.  Pupil
s equal, reactive to light.  V:  Normal facial sensation bilaterally.  VII:  Face slightly asymmetric
al likely to chronic right hemifacial spasm, but the patient is able to close his eyes and smile symm
etrically between spasms.  VIII:  Grossly normal auditory acuity to normal speech and finger:  IX/X: 
Symmetrical palate raise.  XI:  5/5 sternocleidomastoid and shoulder shrug.  XII:  No tongue deviatio
n when protruded.  His motor exam 5/5 bilaterally upper and lower extremities with no pronator drift.
  Coordination flat.  Cerebellar:  Patient had no ataxia or dysmetria with finger-to-finger or finger
-to-nose, testing.  Gait not assessed secondary to condition.  Sensation grossly intact to light touc
h.  Deep tendon reflexes were 1+ throughout with no clonus, Babinski, or George sign.

 

LABORATORY DATA:  White count was 10.7 and 9.7 this morning, hemoglobin 16.7 and platelets 261.  Coag
ulation panel was within normal limits with an INR of 0.94 and APTT 24.1.  Sodium 140, BUN and creati
nine 11 and 0.7, glucose of 247.  Tox screen was negative.

 

IMAGING:  I reviewed the patient's CT scan of the head and CT angiogram.  Had discussed history of pr
esent illness.

 

ASSESSMENT AND PLAN:  A 48-year-old male status post right thalamic hemorrhage and hemifacial spasm. 
 I discussed patient's signs, symptoms, physical examination and radiographic findings with the patie
nt and his family.  The patient has a small bleed that typically should not progress to require any t
ype of neurosurgical intervention.  Blood pressure control is recommended per neurology.  The patient
 should have followup and imaging though the patient does not have any anticoagulation disorder and d
oes not take any antiplatelet medications and therefore it is unlikely that the bleed will progress a
fter 24 hours.  ICU and medical management is recommended, but typically there is no indication curre
ntly and typically unusual to require any neurosurgical intervention, as no underlying lesion is seen
 on CT.  

 

The patient is to have an MR today.  I recommended a fiesta sequence performed as well to assess the 
patient for his hemifacial spasm.  This is a chronic issue and the patient should follow up for refer
ral to a neurologist for this.  I explained that medication and injection treatments may be performed
 first and should these fail, then surgical treatment can be considered.  This issue can be managed a
s an outpatient.  The patient expressed understanding and agreed with this plan of care.

 

 

Dictated By: SARAHY VIEYRA MD, LG/KIKE

DD:    03/03/2019 12:07:12

DT:    03/03/2019 13:26:49

Conf#: [id number]

DID#:  [id number]

CC: KATJA MARY MD; JIM MARTINEZ MD;*[facility]*
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
## 2019-03-04 VITALS — DIASTOLIC BLOOD PRESSURE: 69 MMHG | HEART RATE: 74 BPM | RESPIRATION RATE: 14 BRPM | SYSTOLIC BLOOD PRESSURE: 105 MMHG

## 2019-03-04 VITALS — RESPIRATION RATE: 15 BRPM | DIASTOLIC BLOOD PRESSURE: 79 MMHG | SYSTOLIC BLOOD PRESSURE: 116 MMHG | HEART RATE: 76 BPM

## 2019-03-04 VITALS — RESPIRATION RATE: 13 BRPM | DIASTOLIC BLOOD PRESSURE: 72 MMHG | SYSTOLIC BLOOD PRESSURE: 135 MMHG | HEART RATE: 97 BPM

## 2019-03-04 VITALS — SYSTOLIC BLOOD PRESSURE: 146 MMHG | RESPIRATION RATE: 19 BRPM | HEART RATE: 82 BPM | DIASTOLIC BLOOD PRESSURE: 92 MMHG

## 2019-03-04 VITALS — SYSTOLIC BLOOD PRESSURE: 132 MMHG | HEART RATE: 81 BPM | DIASTOLIC BLOOD PRESSURE: 87 MMHG | RESPIRATION RATE: 17 BRPM

## 2019-03-04 VITALS — HEART RATE: 69 BPM | SYSTOLIC BLOOD PRESSURE: 113 MMHG | RESPIRATION RATE: 13 BRPM | DIASTOLIC BLOOD PRESSURE: 73 MMHG

## 2019-03-04 VITALS — SYSTOLIC BLOOD PRESSURE: 123 MMHG | DIASTOLIC BLOOD PRESSURE: 79 MMHG | HEART RATE: 71 BPM | RESPIRATION RATE: 14 BRPM

## 2019-03-04 VITALS — DIASTOLIC BLOOD PRESSURE: 113 MMHG | SYSTOLIC BLOOD PRESSURE: 133 MMHG | HEART RATE: 81 BPM | RESPIRATION RATE: 14 BRPM

## 2019-03-04 VITALS — DIASTOLIC BLOOD PRESSURE: 83 MMHG | RESPIRATION RATE: 14 BRPM | SYSTOLIC BLOOD PRESSURE: 133 MMHG | HEART RATE: 74 BPM

## 2019-03-04 VITALS — RESPIRATION RATE: 16 BRPM | HEART RATE: 71 BPM | DIASTOLIC BLOOD PRESSURE: 79 MMHG | SYSTOLIC BLOOD PRESSURE: 115 MMHG

## 2019-03-04 VITALS — HEART RATE: 80 BPM | DIASTOLIC BLOOD PRESSURE: 82 MMHG | RESPIRATION RATE: 14 BRPM | SYSTOLIC BLOOD PRESSURE: 123 MMHG

## 2019-03-04 VITALS — HEART RATE: 74 BPM | RESPIRATION RATE: 14 BRPM | DIASTOLIC BLOOD PRESSURE: 82 MMHG | SYSTOLIC BLOOD PRESSURE: 132 MMHG

## 2019-03-04 VITALS — SYSTOLIC BLOOD PRESSURE: 129 MMHG | RESPIRATION RATE: 15 BRPM | HEART RATE: 72 BPM | DIASTOLIC BLOOD PRESSURE: 84 MMHG

## 2019-03-04 VITALS — RESPIRATION RATE: 14 BRPM | HEART RATE: 81 BPM | DIASTOLIC BLOOD PRESSURE: 80 MMHG | SYSTOLIC BLOOD PRESSURE: 126 MMHG

## 2019-03-04 VITALS — SYSTOLIC BLOOD PRESSURE: 91 MMHG | RESPIRATION RATE: 14 BRPM | DIASTOLIC BLOOD PRESSURE: 56 MMHG | HEART RATE: 72 BPM

## 2019-03-04 VITALS — RESPIRATION RATE: 16 BRPM | DIASTOLIC BLOOD PRESSURE: 83 MMHG | HEART RATE: 74 BPM | SYSTOLIC BLOOD PRESSURE: 157 MMHG

## 2019-03-04 VITALS — RESPIRATION RATE: 18 BRPM | SYSTOLIC BLOOD PRESSURE: 135 MMHG | HEART RATE: 78 BPM | DIASTOLIC BLOOD PRESSURE: 79 MMHG

## 2019-03-04 VITALS — SYSTOLIC BLOOD PRESSURE: 118 MMHG | RESPIRATION RATE: 14 BRPM | DIASTOLIC BLOOD PRESSURE: 79 MMHG | HEART RATE: 67 BPM

## 2019-03-04 VITALS — HEART RATE: 79 BPM | SYSTOLIC BLOOD PRESSURE: 140 MMHG | RESPIRATION RATE: 15 BRPM | DIASTOLIC BLOOD PRESSURE: 90 MMHG

## 2019-03-04 VITALS — SYSTOLIC BLOOD PRESSURE: 102 MMHG | HEART RATE: 70 BPM | RESPIRATION RATE: 13 BRPM | DIASTOLIC BLOOD PRESSURE: 65 MMHG

## 2019-03-04 VITALS — DIASTOLIC BLOOD PRESSURE: 87 MMHG | SYSTOLIC BLOOD PRESSURE: 132 MMHG | HEART RATE: 85 BPM

## 2019-03-04 VITALS — DIASTOLIC BLOOD PRESSURE: 78 MMHG | RESPIRATION RATE: 8 BRPM | HEART RATE: 74 BPM | SYSTOLIC BLOOD PRESSURE: 118 MMHG

## 2019-03-04 VITALS — RESPIRATION RATE: 13 BRPM | HEART RATE: 71 BPM

## 2019-03-04 VITALS — HEART RATE: 70 BPM | SYSTOLIC BLOOD PRESSURE: 101 MMHG | DIASTOLIC BLOOD PRESSURE: 67 MMHG | RESPIRATION RATE: 12 BRPM

## 2019-03-04 VITALS — SYSTOLIC BLOOD PRESSURE: 124 MMHG | HEART RATE: 79 BPM | RESPIRATION RATE: 21 BRPM | DIASTOLIC BLOOD PRESSURE: 86 MMHG

## 2019-03-04 VITALS — HEART RATE: 86 BPM | RESPIRATION RATE: 18 BRPM | DIASTOLIC BLOOD PRESSURE: 88 MMHG | SYSTOLIC BLOOD PRESSURE: 140 MMHG

## 2019-03-04 VITALS — DIASTOLIC BLOOD PRESSURE: 69 MMHG | RESPIRATION RATE: 14 BRPM | HEART RATE: 68 BPM | SYSTOLIC BLOOD PRESSURE: 101 MMHG

## 2019-03-04 VITALS — HEART RATE: 86 BPM | DIASTOLIC BLOOD PRESSURE: 70 MMHG | SYSTOLIC BLOOD PRESSURE: 91 MMHG | RESPIRATION RATE: 16 BRPM

## 2019-03-04 VITALS — HEART RATE: 89 BPM | SYSTOLIC BLOOD PRESSURE: 156 MMHG | RESPIRATION RATE: 18 BRPM | DIASTOLIC BLOOD PRESSURE: 91 MMHG

## 2019-03-04 VITALS — RESPIRATION RATE: 12 BRPM | DIASTOLIC BLOOD PRESSURE: 66 MMHG | HEART RATE: 67 BPM | SYSTOLIC BLOOD PRESSURE: 104 MMHG

## 2019-03-04 VITALS — HEART RATE: 70 BPM | DIASTOLIC BLOOD PRESSURE: 75 MMHG | SYSTOLIC BLOOD PRESSURE: 122 MMHG | RESPIRATION RATE: 16 BRPM

## 2019-03-04 VITALS — HEART RATE: 70 BPM | SYSTOLIC BLOOD PRESSURE: 106 MMHG | RESPIRATION RATE: 13 BRPM | DIASTOLIC BLOOD PRESSURE: 71 MMHG

## 2019-03-04 VITALS — DIASTOLIC BLOOD PRESSURE: 69 MMHG | RESPIRATION RATE: 13 BRPM | SYSTOLIC BLOOD PRESSURE: 106 MMHG | HEART RATE: 73 BPM

## 2019-03-04 VITALS — DIASTOLIC BLOOD PRESSURE: 83 MMHG | RESPIRATION RATE: 20 BRPM | SYSTOLIC BLOOD PRESSURE: 138 MMHG | HEART RATE: 84 BPM

## 2019-03-04 VITALS — SYSTOLIC BLOOD PRESSURE: 118 MMHG | RESPIRATION RATE: 14 BRPM | HEART RATE: 74 BPM | DIASTOLIC BLOOD PRESSURE: 70 MMHG

## 2019-03-04 VITALS — SYSTOLIC BLOOD PRESSURE: 125 MMHG | HEART RATE: 69 BPM | RESPIRATION RATE: 14 BRPM | DIASTOLIC BLOOD PRESSURE: 88 MMHG

## 2019-03-04 VITALS — DIASTOLIC BLOOD PRESSURE: 58 MMHG | HEART RATE: 70 BPM | SYSTOLIC BLOOD PRESSURE: 94 MMHG | RESPIRATION RATE: 13 BRPM

## 2019-03-04 VITALS — SYSTOLIC BLOOD PRESSURE: 137 MMHG | DIASTOLIC BLOOD PRESSURE: 79 MMHG | RESPIRATION RATE: 14 BRPM | HEART RATE: 76 BPM

## 2019-03-04 VITALS — DIASTOLIC BLOOD PRESSURE: 82 MMHG | HEART RATE: 73 BPM | SYSTOLIC BLOOD PRESSURE: 119 MMHG | RESPIRATION RATE: 15 BRPM

## 2019-03-04 VITALS — RESPIRATION RATE: 12 BRPM | HEART RATE: 70 BPM | SYSTOLIC BLOOD PRESSURE: 103 MMHG | DIASTOLIC BLOOD PRESSURE: 69 MMHG

## 2019-03-04 VITALS — SYSTOLIC BLOOD PRESSURE: 148 MMHG | HEART RATE: 99 BPM | DIASTOLIC BLOOD PRESSURE: 97 MMHG | RESPIRATION RATE: 15 BRPM

## 2019-03-04 VITALS — HEART RATE: 86 BPM | DIASTOLIC BLOOD PRESSURE: 91 MMHG | SYSTOLIC BLOOD PRESSURE: 137 MMHG | RESPIRATION RATE: 18 BRPM

## 2019-03-04 VITALS — HEART RATE: 93 BPM

## 2019-03-04 VITALS — SYSTOLIC BLOOD PRESSURE: 108 MMHG | DIASTOLIC BLOOD PRESSURE: 77 MMHG | RESPIRATION RATE: 15 BRPM | HEART RATE: 71 BPM

## 2019-03-04 VITALS — HEART RATE: 85 BPM

## 2019-03-04 VITALS — HEART RATE: 78 BPM

## 2019-03-04 LAB
ADD MAN DIFF?: NO
ANION GAP: 8 (ref 5–13)
BASOPHIL #: 0.1 10^3/UL (ref 0–0.1)
BASOPHILS %: 0.5 % (ref 0–2)
BLOOD UREA NITROGEN: 20 MG/DL (ref 7–20)
CALCIUM: 9 MG/DL (ref 8.4–10.2)
CARBON DIOXIDE: 26 MMOL/L (ref 21–31)
CHLORIDE: 106 MMOL/L (ref 97–110)
CREATININE: 1.04 MG/DL (ref 0.61–1.24)
EOSINOPHILS #: 0.2 10^3/UL (ref 0–0.5)
EOSINOPHILS %: 1.4 % (ref 0–7)
GLUCOSE: 151 MG/DL (ref 70–220)
HEMATOCRIT: 44.3 % (ref 42–52)
HEMOGLOBIN: 15.6 G/DL (ref 14–18)
IMMATURE GRANS #M: 0.04 10^3/UL (ref 0–0.03)
IMMATURE GRANS % (M): 0.3 % (ref 0–0.43)
LYMPHOCYTES #: 4 10^3/UL (ref 0.8–2.9)
LYMPHOCYTES %: 32.8 % (ref 15–51)
MAGNESIUM: 2.2 MG/DL (ref 1.7–2.5)
MEAN CORPUSCULAR HEMOGLOBIN: 30.9 PG (ref 29–33)
MEAN CORPUSCULAR HGB CONC: 35.2 G/DL (ref 32–37)
MEAN CORPUSCULAR VOLUME: 87.7 FL (ref 82–101)
MEAN PLATELET VOLUME: 10.5 FL (ref 7.4–10.4)
MONOCYTE #: 0.9 10^3/UL (ref 0.3–0.9)
MONOCYTES %: 7.1 % (ref 0–11)
NEUTROPHIL #: 7 10^3/UL (ref 1.6–7.5)
NEUTROPHILS %: 57.9 % (ref 39–77)
NUCLEATED RED BLOOD CELLS #: 0 10^3/UL (ref 0–0)
NUCLEATED RED BLOOD CELLS%: 0 /100WBC (ref 0–0)
PHOSPHORUS: 4.3 MG/DL (ref 2.5–4.9)
PLATELET COUNT: 268 10^3/UL (ref 140–415)
POTASSIUM: 4 MMOL/L (ref 3.5–5.1)
RED BLOOD COUNT: 5.05 10^6/UL (ref 4.7–6.1)
RED CELL DISTRIBUTION WIDTH: 11.3 % (ref 11.5–14.5)
SODIUM: 140 MMOL/L (ref 135–144)
WHITE BLOOD COUNT: 12.2 10^3/UL (ref 4.8–10.8)

## 2019-03-04 RX ADMIN — INSULIN ASPART 1 UNIT: 100 INJECTION, SOLUTION INTRAVENOUS; SUBCUTANEOUS at 06:15

## 2019-03-04 RX ADMIN — AMLODIPINE BESYLATE 1 MG: 10 TABLET ORAL at 08:26

## 2019-03-04 RX ADMIN — LORAZEPAM 1 MG: 2 INJECTION, SOLUTION INTRAMUSCULAR; INTRAVENOUS at 13:01

## 2019-03-04 RX ADMIN — PHENYLEPHRINE HYDROCHLORIDE SCH MLS/HR: 10 INJECTION INTRAVENOUS at 04:41

## 2019-03-04 RX ADMIN — AMLODIPINE BESYLATE SCH MG: 10 TABLET ORAL at 08:26

## 2019-03-04 RX ADMIN — INSULIN ASPART 1 UNIT: 100 INJECTION, SOLUTION INTRAVENOUS; SUBCUTANEOUS at 09:42

## 2019-03-04 RX ADMIN — FAMOTIDINE 1 MG: 20 TABLET ORAL at 21:33

## 2019-03-04 RX ADMIN — METOPROLOL TARTRATE 1 MG: 50 TABLET, FILM COATED ORAL at 08:27

## 2019-03-04 RX ADMIN — IOHEXOL 1: 350 INJECTION, SOLUTION INTRAVENOUS at 08:25

## 2019-03-04 RX ADMIN — METOPROLOL TARTRATE SCH MG: 50 TABLET, FILM COATED ORAL at 08:27

## 2019-03-04 RX ADMIN — FENOFIBRATE SCH MG: 48 TABLET ORAL at 08:25

## 2019-03-04 RX ADMIN — FAMOTIDINE 1 MG: 20 TABLET ORAL at 08:27

## 2019-03-04 RX ADMIN — FAMOTIDINE SCH MG: 20 TABLET ORAL at 08:27

## 2019-03-04 RX ADMIN — METOPROLOL TARTRATE SCH MG: 50 TABLET, FILM COATED ORAL at 21:34

## 2019-03-04 RX ADMIN — METOPROLOL TARTRATE 1 MG: 50 TABLET, FILM COATED ORAL at 21:34

## 2019-03-04 RX ADMIN — ZOLPIDEM TARTRATE 1 MG: 5 TABLET, FILM COATED ORAL at 23:20

## 2019-03-04 RX ADMIN — INSULIN ASPART 1 UNIT: 100 INJECTION, SOLUTION INTRAVENOUS; SUBCUTANEOUS at 17:32

## 2019-03-04 RX ADMIN — SODIUM CHLORIDE 1 ML: 9 INJECTION, SOLUTION INTRAMUSCULAR; INTRAVENOUS; SUBCUTANEOUS at 08:25

## 2019-03-04 RX ADMIN — FAMOTIDINE SCH MG: 20 TABLET ORAL at 21:33

## 2019-03-04 RX ADMIN — VASOPRESSIN 1: 20 INJECTION, SOLUTION INTRAMUSCULAR; SUBCUTANEOUS at 08:25

## 2019-03-04 RX ADMIN — FENOFIBRATE 1 MG: 48 TABLET ORAL at 08:25

## 2019-03-04 RX ADMIN — INSULIN ASPART 1 UNIT: 100 INJECTION, SOLUTION INTRAVENOUS; SUBCUTANEOUS at 03:22

## 2019-03-04 RX ADMIN — INSULIN ASPART 1 UNIT: 100 INJECTION, SOLUTION INTRAVENOUS; SUBCUTANEOUS at 12:43

## 2019-03-04 RX ADMIN — ABCIXIMAB 1 MLS/HR: 2 INJECTION, SOLUTION INTRAVENOUS at 04:41

## 2019-03-04 RX ADMIN — INSULIN ASPART 1 UNIT: 100 INJECTION, SOLUTION INTRAVENOUS; SUBCUTANEOUS at 21:42

## 2019-03-04 RX ADMIN — ZOLPIDEM TARTRATE PRN MG: 5 TABLET, FILM COATED ORAL at 23:20
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
## 2019-03-04 NOTE — CONS
Assessment/Plan


Assessment/Plan


Assessment/Plan (Daily)


S/p right thalamic ICH. 


Neurologically stable.


Bleed unlikely to progress at this point. 


No acute neurosurgical issues. 


f/u with PMD re: facial spasm for meds/Botox initial treatment, can refer for 


surgical treatment if these treatments are ineffective.





Consultation Date/Type/Reason


Admit Date/Time


Mar 3, 2019 at 00:45


Initial Consult Date





Type of Consult


Neurosurgery


Reason for Consultation


[facility]


Requesting Provider:  JAIME HERNÁNDEZ MD


Date/Time of Note


DATE: 3/4/19 


TIME: 18:21





24 HR Interval Summary


Free Text/Dictation


Patient doing well. Dizziness has improved. Denies headache, nausea or vomiting.





Exam/Review of Systems


Exam


Vitals





Vital Signs


  Date      Temp  Pulse  Resp  B/P (MAP)   Pulse Ox  O2          O2 Flow    FiO2


Time                                                 Delivery    Rate


    3/4/19           93


     17:06


    3/4/19                 15      148/97        98  Room Air


     15:00                          (114)


    3/4/19  98.4


     13:00








Intake and Output





3/3/19


3/3/19


3/4/19





1515:00


23:00


07:00





IntakeIntake Total


685 ml


599.17 ml


255 ml





OutputOutput Total


200 ml


600 ml


0 ml





BalanceBalance


485 ml


-0.83 ml


255 ml











Constitutional:  alert, oriented


Psych:  no complaints


Head:  normocephalic


Neurological:  CNS II-XII intact, nl mental status, nl speech, nl strength (left


facial spasm, chronic), other





Results


Result Diagram:  


3/4/19 0445                                                                     


          3/4/19 0445





Results 24hrs





Laboratory Tests


Test
                     3/3/19
22:31  3/4/19
03:21  3/4/19
04:45  3/4/19
06:15


Bedside Glucose                 261  H         172                         172


White Blood Count                                         12.2  #H


Red Blood Count                                             5.05


Hemoglobin                                                  15.6


Hematocrit                                                  44.3


Mean Corpuscular Volume                                     87.7


Mean Corpuscular                                            30.9


Hemoglobin


Mean Corpuscular          
             
                  35.2  
  



Hemoglobin
Concent


Red Cell Distribution                                      11.3  L


Width


Platelet Count                                               268


Mean Platelet Volume                                       10.5  H


Immature Granulocytes %                                    0.300


Neutrophils %                                               57.9


Lymphocytes %                                               32.8


Monocytes %                                                  7.1


Eosinophils %                                                1.4


Basophils %                                                  0.5


Nucleated Red Blood                                          0.0


Cells %


Immature Granulocytes #                                   0.040  H


Neutrophils #                                                7.0


Lymphocytes #                                               4.0  H


Monocytes #                                                  0.9


Eosinophils #                                                0.2


Basophils #                                                  0.1


Nucleated Red Blood                                          0.0


Cells #


Sodium Level                                                 140


Potassium Level                                              4.0


Chloride Level                                               106


Carbon Dioxide Level                                          26


Anion Gap                                                      8


Blood Urea Nitrogen                                           20


Creatinine                                                  1.04


Est Glomerular Filtrat    
             
             > 60  
       



Rate
mL/min


Glucose Level                                                151


Calcium Level                                                9.0


Phosphorus Level                                             4.3


Magnesium Level                                              2.2


Test
                     3/4/19
08:07  3/4/19
09:39  3/4/19
12:43  3/4/19
17:28


Bedside Glucose                  174          350  H         194           217








Imaging


Imaging


MRI brain- stable ICH. No other concerning findings.





Medications


Medication





Current Medications


Ondansetron HCl (Zofran Inj) 4 mg Q6H  PRN IV NAUSEA AND/OR VOMITING;  Start 


3/3/19 at 01:00


Acetaminophen (Tylenol Liquid) 650 mg Q6H  PRN PO PAIN LEVEL 1-3 OR FEVER;  


Start 3/3/19 at 01:00


Famotidine (Pepcid) 20 mg Q12 PO  Last administered on 3/4/19at 08:27; Admin 


Dose 20 MG;  Start 3/3/19 at 09:00


Amlodipine Besylate (Norvasc) 10 mg DAILY PO  Last administered on 3/4/19at 


08:26; Admin Dose 10 MG;  Start 3/3/19 at 09:00


Metoprolol Tartrate (Lopressor) 50 mg BID PO  Last administered on 3/4/19at 


08:27; Admin Dose 50 MG;  Start 3/3/19 at 09:00


Fenofibrate (Tricor) 48 mg DAILY PO  Last administered on 3/4/19at 08:25; Admin 


Dose 48 MG;  Start 3/3/19 at 11:00


Diagnostic Test (Pha) (Accu-Chek) 1 ea 02 XX  Last administered on 3/4/19at 


03:22; Admin Dose 1 EA;  Start 3/4/19 at 02:00


Miscellaneous Information 1 ea NOTE XX ;  Start 3/3/19 at 09:30


Glucose (Glutose) 15 gm Q15M  PRN PO DECREASED GLUCOSE;  Start 3/3/19 at 09:30


Glucose (Glutose) 22.5 gm Q15M  PRN PO DECREASED GLUCOSE;  Start 3/3/19 at 09:30


Dextrose (D50w Syringe) 25 ml Q15M  PRN IV DECREASED GLUCOSE;  Start 3/3/19 at 


09:30


Dextrose (D50w Syringe) 50 ml Q15M  PRN IV DECREASED GLUCOSE;  Start 3/3/19 at 


09:30


Glucagon (Glucagen) 1 mg Q15M  PRN IM DECREASED GLUCOSE;  Start 3/3/19 at 09:30


Glucose (Glutose) 15 gm Q15M  PRN BUCCAL DECREASED GLUCOSE;  Start 3/3/19 at 


09:30


Lorazepam (Ativan) 1 mg ONCE  PRN IV ANXIETY Last administered on 3/4/19at 


13:01; Admin Dose 1 MG;  Start 3/4/19 at 08:00;  Stop 3/4/19 at 19:00


Zolpidem Tartrate (Ambien) 2.5 mg HS  PRN PO INSOMNIA Last administered on 


3/3/19at 22:22; Admin Dose 2.5 MG;  Start 3/3/19 at 17:30


Insulin Aspart (Novolog Insulin Pen) NOVOLOG *MILD* ALGORI... IACHS SC  Last 


administered on 3/4/19at 12:43; Admin Dose 2 UNIT;  Start 3/4/19 at 11:00











SARAHY VIEYRA MD             Mar 4, 2019 18:24

## 2019-03-05 VITALS — HEART RATE: 79 BPM

## 2019-03-05 VITALS — SYSTOLIC BLOOD PRESSURE: 144 MMHG | HEART RATE: 83 BPM | DIASTOLIC BLOOD PRESSURE: 96 MMHG | RESPIRATION RATE: 20 BRPM

## 2019-03-05 VITALS — HEART RATE: 77 BPM | DIASTOLIC BLOOD PRESSURE: 95 MMHG | SYSTOLIC BLOOD PRESSURE: 146 MMHG | RESPIRATION RATE: 20 BRPM

## 2019-03-05 VITALS — RESPIRATION RATE: 18 BRPM | DIASTOLIC BLOOD PRESSURE: 94 MMHG | SYSTOLIC BLOOD PRESSURE: 141 MMHG | HEART RATE: 79 BPM

## 2019-03-05 VITALS — SYSTOLIC BLOOD PRESSURE: 133 MMHG | DIASTOLIC BLOOD PRESSURE: 99 MMHG | HEART RATE: 87 BPM | RESPIRATION RATE: 18 BRPM

## 2019-03-05 VITALS — SYSTOLIC BLOOD PRESSURE: 143 MMHG | HEART RATE: 73 BPM | RESPIRATION RATE: 20 BRPM | DIASTOLIC BLOOD PRESSURE: 99 MMHG

## 2019-03-05 VITALS — HEART RATE: 74 BPM

## 2019-03-05 VITALS — HEART RATE: 90 BPM

## 2019-03-05 LAB
ADD MAN DIFF?: NO
ANION GAP: 8 (ref 5–13)
BASOPHIL #: 0.1 10^3/UL (ref 0–0.1)
BASOPHILS %: 0.5 % (ref 0–2)
BLOOD UREA NITROGEN: 18 MG/DL (ref 7–20)
CALCIUM: 9.4 MG/DL (ref 8.4–10.2)
CARBON DIOXIDE: 28 MMOL/L (ref 21–31)
CHLORIDE: 102 MMOL/L (ref 97–110)
CREATININE: 0.85 MG/DL (ref 0.61–1.24)
EOSINOPHILS #: 0.2 10^3/UL (ref 0–0.5)
EOSINOPHILS %: 1.5 % (ref 0–7)
GLUCOSE: 194 MG/DL (ref 70–220)
HEMATOCRIT: 46 % (ref 42–52)
HEMOGLOBIN: 15.8 G/DL (ref 14–18)
IMMATURE GRANS #M: 0.04 10^3/UL (ref 0–0.03)
IMMATURE GRANS % (M): 0.4 % (ref 0–0.43)
LYMPHOCYTES #: 2.8 10^3/UL (ref 0.8–2.9)
LYMPHOCYTES %: 25.4 % (ref 15–51)
MAGNESIUM: 2 MG/DL (ref 1.7–2.5)
MEAN CORPUSCULAR HEMOGLOBIN: 30.5 PG (ref 29–33)
MEAN CORPUSCULAR HGB CONC: 34.3 G/DL (ref 32–37)
MEAN CORPUSCULAR VOLUME: 88.8 FL (ref 82–101)
MEAN PLATELET VOLUME: 10.4 FL (ref 7.4–10.4)
MONOCYTE #: 1 10^3/UL (ref 0.3–0.9)
MONOCYTES %: 9.4 % (ref 0–11)
NEUTROPHIL #: 6.9 10^3/UL (ref 1.6–7.5)
NEUTROPHILS %: 62.8 % (ref 39–77)
NUCLEATED RED BLOOD CELLS #: 0 10^3/UL (ref 0–0)
NUCLEATED RED BLOOD CELLS%: 0 /100WBC (ref 0–0)
PHOSPHORUS: 3.1 MG/DL (ref 2.5–4.9)
PLATELET COUNT: 271 10^3/UL (ref 140–415)
POTASSIUM: 4.2 MMOL/L (ref 3.5–5.1)
RED BLOOD COUNT: 5.18 10^6/UL (ref 4.7–6.1)
RED CELL DISTRIBUTION WIDTH: 10.9 % (ref 11.5–14.5)
SODIUM: 138 MMOL/L (ref 135–144)
WHITE BLOOD COUNT: 11 10^3/UL (ref 4.8–10.8)

## 2019-03-05 RX ADMIN — INSULIN ASPART 1 UNIT: 100 INJECTION, SOLUTION INTRAVENOUS; SUBCUTANEOUS at 07:49

## 2019-03-05 RX ADMIN — FENOFIBRATE SCH MG: 48 TABLET ORAL at 08:27

## 2019-03-05 RX ADMIN — FAMOTIDINE SCH MG: 20 TABLET ORAL at 08:27

## 2019-03-05 RX ADMIN — INSULIN ASPART 1 UNIT: 100 INJECTION, SOLUTION INTRAVENOUS; SUBCUTANEOUS at 11:54

## 2019-03-05 RX ADMIN — FAMOTIDINE 1 MG: 20 TABLET ORAL at 08:27

## 2019-03-05 RX ADMIN — METOPROLOL TARTRATE 1 MG: 50 TABLET, FILM COATED ORAL at 08:27

## 2019-03-05 RX ADMIN — AMLODIPINE BESYLATE SCH MG: 10 TABLET ORAL at 08:27

## 2019-03-05 RX ADMIN — FENOFIBRATE 1 MG: 48 TABLET ORAL at 08:27

## 2019-03-05 RX ADMIN — AMLODIPINE BESYLATE 1 MG: 10 TABLET ORAL at 08:27

## 2019-03-05 RX ADMIN — METOPROLOL TARTRATE SCH MG: 50 TABLET, FILM COATED ORAL at 08:27

## 2019-03-05 NOTE — PDOCDIS
Discharge Instructions


DIAGNOSIS


Discharge Diagnosis


Essential hypertension


non-insulin dependent diabetes


Intraparenchymal thalamic hemorrhage





CONDITION


                 [id number]
Patient Condition:  [id number]
Good








HOME CARE INSTRUCTIONS:


         [id number]
Diet Instructions:  [id number]
Low Fat /Cholesterol








ACTIVITY:


          [id number]
Activity Restrictions:  [id number]
No Restrictions








FOLLOW UP/APPOINTMENTS


Follow-up Plan


1. You have high blood pressure and diabetes. These are both lifelong 


conditions. If they are managed poorly you will be at risk for heart attack, 


stroke, kidney failure, and blindness in the future. 


2. You should go through your health insurance to get a primary care doctor you 


can see regularly. 


3. For diabetes, take metformin twice daily. This medicine can cause abdominal 


bloating and diarrhea. If this happens try taking it once a day only before 


quitting.


4. For high blood pressure, take metoprolol and amlodipine. Your primary care 


doctor may change your blood pressure medicines.


5. For suddenly worsening headache not responsive to tylenol, return to the 


emergency room.








1. Usted tiene presin arterial demond y diabetes. Estas son condiciones de por 


brionna. Si se administran de manera deficiente, correr el riesgo de sufrir un 


ataque cardaco, un derrame cerebral, insuficiencia renal y ceguera en el 


futuro.


2. Debe consultar contreras seguro de johnathan para obtener un mdico de atencin primaria


que pueda nikunj regularmente.


3. Para la diabetes, tome metformina dos veces al da. Nasrin medicamento puede 


causar hinchazn abdominal y diarrea. Si esto sucede, intente tomarlo nir vez al


da solo antes de dejar de fumar.


4. Para la presin arterial demond, tome metoprolol y amlodipine. Contreras mdico de 


atencin primaria puede cambiar dontae medicamentos para la presin arterial.


5. Para el empeoramiento repentino del dolor de lawson que no responde al 


tylenol, regrese a la nash de emergencias.











JAMIE HERNÁNDEZ MD               Mar 5, 2019 11:53

## 2019-03-05 NOTE — CONS
Assessment/Plan


Assessment/Plan


Hospital Course


47 yo M c/ chronic facial twitching but otherwise uncertain PMHx due to poor 


medical follow up...who presents for evaluation of dizziness and generalized 


weakness..





CT Head revealed a R thalamic hemorrhage for which neurology is consulted..


Likely hypertensive in etiology.





MRI brain was negative for underlying ischemia or mass..


CTA Head was without adjacent vascular abnl..


UDS neg








P


Avoid antiplatelets/anticoagulants in the short term


Continued BP control (goal: strict normotension)


PT/OT/ST as necessary


Other management per primary


Neurologically cleared for d/c..





Consultation Date/Type/Reason


Admit Date/Time


Mar 3, 2019 at 00:45


Type of Consult


Neurology


Reason for Consultation


[facility]


Requesting Provider:  JAIME HERNÁNDEZ MD


Date/Time of Note


DATE: 3/5/19 


TIME: 14:34





24 HR Interval Summary


Free Text/Dictation


Continues acute care





Exam


Vital Signs


Vitals





Vital Signs


  Date      Temp  Pulse  Resp  B/P (MAP)   Pulse Ox  O2          O2 Flow    FiO2


Time                                                 Delivery    Rate


    3/5/19           74


     12:01


    3/5/19  97.3           20      143/99        97


     11:43                          (114)


    3/4/19                                           Room Air


     15:00








Intake and Output





3/4/19


3/4/19


3/5/19





1515:00


23:00


07:00





IntakeIntake Total


910 ml


200 ml





OutputOutput Total


790 ml





BalanceBalance


120 ml


200 ml














Exam


PE:


Gen Appearance: No Apparent Distress


HEENT: Normocephalic


Cardiovascular: Regular rate


Abdomen: Soft


Extremities: Dry





NE:


The patient was alert and oriented. Language was normal. Fund of knowledge was 


normal.





Pupils were equal and reactive to light. There was no afferent pupillary defect.


Visual fields were normal. Funduscopic examination showed sharp disc margins and


spontaneous venous pulsations. Extra-ocular movements were full. Ptosis was 


absent. There was no nystagmus. Facial sensation was normal. Face was symmetric 


with normal strength. R facial twitching noted. Hearing was intact. Palate 


movements were normal. Neck strength was normal. There was normal tongue bulk 


and speed of movement.





Tone was normal. Muscle bulk was normal. I did not see fasciculations. Arms and 


legs were strong.





Vibration sensation was normal. Temperature and pinprick sensation was normal.





Rapid alternating movements were normal. There was no dysmetria. There was no 


intention tremor. Gait was deferred due to bedrest.





Arm and leg reflexes were symmetric. Du's sign was absent. Plantar 


responses were flexor.











IGNACIO HELLER NP           Mar 5, 2019 14:35


MICKI FRAGA                  Mar 5, 2019 16:11
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
